# Patient Record
Sex: MALE | Race: WHITE
[De-identification: names, ages, dates, MRNs, and addresses within clinical notes are randomized per-mention and may not be internally consistent; named-entity substitution may affect disease eponyms.]

---

## 2018-06-07 ENCOUNTER — HOSPITAL ENCOUNTER (INPATIENT)
Dept: HOSPITAL 96 - M.ERS | Age: 63
LOS: 1 days | Discharge: HOME | DRG: 313 | End: 2018-06-08
Attending: INTERNAL MEDICINE | Admitting: INTERNAL MEDICINE
Payer: COMMERCIAL

## 2018-06-07 VITALS — WEIGHT: 254 LBS | BODY MASS INDEX: 37.62 KG/M2 | HEIGHT: 69.02 IN

## 2018-06-07 VITALS — DIASTOLIC BLOOD PRESSURE: 67 MMHG | SYSTOLIC BLOOD PRESSURE: 105 MMHG

## 2018-06-07 VITALS — DIASTOLIC BLOOD PRESSURE: 75 MMHG | SYSTOLIC BLOOD PRESSURE: 117 MMHG

## 2018-06-07 DIAGNOSIS — I10: ICD-10-CM

## 2018-06-07 DIAGNOSIS — R07.89: Primary | ICD-10-CM

## 2018-06-07 DIAGNOSIS — E66.9: ICD-10-CM

## 2018-06-07 DIAGNOSIS — R06.09: ICD-10-CM

## 2018-06-07 DIAGNOSIS — Z79.899: ICD-10-CM

## 2018-06-07 DIAGNOSIS — K21.9: ICD-10-CM

## 2018-06-07 DIAGNOSIS — Z82.49: ICD-10-CM

## 2018-06-07 DIAGNOSIS — I48.0: ICD-10-CM

## 2018-06-07 LAB
ABSOLUTE BASOPHILS: 0.1 THOU/UL (ref 0–0.2)
ABSOLUTE EOSINOPHILS: 0.2 THOU/UL (ref 0–0.7)
ABSOLUTE MONOCYTES: 0.7 THOU/UL (ref 0–1.2)
ALBUMIN SERPL-MCNC: 3.7 G/DL (ref 3.4–5)
ALP SERPL-CCNC: 75 U/L (ref 46–116)
ALT SERPL-CCNC: 37 U/L (ref 30–65)
ANION GAP SERPL CALC-SCNC: 10 MMOL/L (ref 7–16)
AST SERPL-CCNC: 20 U/L (ref 15–37)
BASOPHILS NFR BLD AUTO: 0.9 %
BILIRUB SERPL-MCNC: 0.3 MG/DL
BUN SERPL-MCNC: 27 MG/DL (ref 7–18)
CALCIUM SERPL-MCNC: 8.3 MG/DL (ref 8.5–10.1)
CHLORIDE SERPL-SCNC: 108 MMOL/L (ref 98–107)
CO2 SERPL-SCNC: 25 MMOL/L (ref 21–32)
CREAT SERPL-MCNC: 0.9 MG/DL (ref 0.6–1.3)
EOSINOPHIL NFR BLD: 2.7 %
GLUCOSE SERPL-MCNC: 121 MG/DL (ref 70–99)
GRANULOCYTES NFR BLD MANUAL: 50.6 %
HCT VFR BLD CALC: 42.8 % (ref 42–52)
HGB BLD-MCNC: 14.5 GM/DL (ref 14–18)
LYMPHOCYTES # BLD: 2.9 THOU/UL (ref 0.8–5.3)
LYMPHOCYTES NFR BLD AUTO: 37 %
MCH RBC QN AUTO: 31.7 PG (ref 26–34)
MCHC RBC AUTO-ENTMCNC: 33.8 G/DL (ref 28–37)
MCV RBC: 93.8 FL (ref 80–100)
MONOCYTES NFR BLD: 8.8 %
MPV: 8.1 FL. (ref 7.2–11.1)
NEUTROPHILS # BLD: 4 THOU/UL (ref 1.6–8.1)
NUCLEATED RBCS: 0 /100WBC
PLATELET COUNT*: 246 THOU/UL (ref 150–400)
POTASSIUM SERPL-SCNC: 3.9 MMOL/L (ref 3.5–5.1)
PROT SERPL-MCNC: 6.7 G/DL (ref 6.4–8.2)
RBC # BLD AUTO: 4.56 MIL/UL (ref 4.5–6)
RDW-CV: 12.9 % (ref 10.5–14.5)
SODIUM SERPL-SCNC: 143 MMOL/L (ref 136–145)
TROPONIN-I LEVEL: <0.06 NG/ML (ref ?–0.06)
WBC # BLD AUTO: 7.9 THOU/UL (ref 4–11)

## 2018-06-08 VITALS — SYSTOLIC BLOOD PRESSURE: 106 MMHG | DIASTOLIC BLOOD PRESSURE: 67 MMHG

## 2018-06-08 VITALS — SYSTOLIC BLOOD PRESSURE: 117 MMHG | DIASTOLIC BLOOD PRESSURE: 73 MMHG

## 2018-06-08 VITALS — DIASTOLIC BLOOD PRESSURE: 74 MMHG | SYSTOLIC BLOOD PRESSURE: 114 MMHG

## 2018-06-08 VITALS — DIASTOLIC BLOOD PRESSURE: 66 MMHG | SYSTOLIC BLOOD PRESSURE: 96 MMHG

## 2018-06-08 NOTE — NUR
VSS. PT ECHO COMPLETED AND RESULTS REVIEWED BY CARDIOLOGIST. CARDIOLOGY HAS
SIGNED OFF. PT OKAY TO DISCHARGE PER TELEPHONE ORDER FROM DR BILL.
DISCHARGE COMPLETED AS CHARTED. DISCHARGE SUMMARY GONE OVER WITH PT. PT
COMMUNICATES UNDERSTANDING. SCRIPTS GIVEN. CARE NOTES GIVEN. PT AWARE OF
FOLLOW UP APPOINTMENTS. IV AND CARDIAC MONITOR REMOVED. ALL BELONGINGS
GATHERED AND READY TO SEND WITH THE PT. PT FINISHING DINNER AT THIS TIME AND
THEN WILL BE READY TO GO HOME WITH SPOUSE. CALL LIGHT IS WITHIN REACH. LOW
FALL RISK PRECAUTIONS ARE IN PLACE. WCTM UNTIL PT DISCHARGES.

## 2018-06-08 NOTE — NUR
RECEIVED REPORT FROM FLEX WHITE. ASSUMED CARE OF PT AROUND 0730. PT A&O X4.
VSS. O2 SAT 98% ON RA. CARDIAC MONITOR IN PLACE TRACING SR. AM ASSESSMENT AND
VITALS COMPLETED AS CHARTED. IV TO RIGHT AC INTACT AND SALINE LOCKED. PT
DENIES PAIN OR DISCOMFORT SO FAR THIS SHIFT. PT INFORMED OF PLAN OF CARE. PT
COMMUNICATES UNDERSTANDING. PT TO HAVE ECHO, AND IF NORMAL PT MAY BE ABLE TO
DC THIS AFTERNOON PER CARDIOLOGY. PT CURRENTLY RESTING IN BED. WIFE AT
BEDSIDE. LOW FALL RISK PRECAUTIONS IN PLACE. CALL LIGHT IS WITHIN REACH.
HOURLY ROUNDING PERFORMED. WCTM FOR DURATION OF SHIFT.

## 2018-06-08 NOTE — NUR
END SHIFT: PT RESTED WELL. NO COMPLAINTS, NO PAIN. NO SOA- ON RA. AWAITING
CARDS CONS. SAFETY PRECAUTIONS IN PLACE. CALL LIGHT IN REACH. PERFORMED HOURLY
ROUNDING. WILL CONT TO MONITOR.

## 2018-06-08 NOTE — EKG
[place] [address]
Phone:  [phone]                     ELECTROCARDIOGRAM REPORT      
_______________________________________________________________________________
 
Name:       YULIET COONEY                Room:           [address] [place]  
[facility]#:  [id number]      Account #:      [id number]  
Admission:  18     Attend Phys:    ROSA Sanchez
Discharge:               Date of Birth:  [birth year]  
         Report #: [id number]
    [id number]
_______________________________________________________________________________
THIS REPORT FOR:  //name//                      
 
                         [facility] ED
                                       
Test Date:    2018               Test Time:    21:05:41
Pat Name:     YULIET COONEY            Department:   
Patient ID:   [id number]            Room:         [place] [address]
Gender:       M                        Technician:   [place]
:          1955               Requested By: Tahira Thomas
Order Number: [id number] MD:   Caleb Hernandez
                                 Measurements
Intervals                              Axis          
Rate:         92                       P:            
IN:                                    QRS:          22
QRSD:         86                       T:            0
QT:           340                                    
QTc:          421                                    
                           Interpretive Statements
Atrial fibrillation
Borderline T abnormalities, inferior leads
No previous ECG available for comparison
 
Electronically Signed On 2018 13:04:32 CDT by Caleb Hernandez
[email or web address]
 
 
 
 
 
 
 
 
 
 
 
 
 
 
 
 
 
 
 
  <ELECTRONICALLY SIGNED>
                                           By: Caleb Hernandez MD, [facility]     
  18     1304
D: 06/2105   _____________________________________
T: 18   Caleb Hernandez MD, FACC       /EPI

## 2018-06-08 NOTE — NUR
MET WITH PT TO DISCUSS HOME SITUATION/DC PLANNING. PT LIVES WITH WIFE.  WORKS
AND IS INDEPENDENT AND ACTIVE. PT USES NO EQUIPMENT OR HOME CARE.  DENIES DC
NEEDS.

## 2018-06-08 NOTE — 2DMMODE
[place] [address]
Phone:  [phone] 2 D/M-MODE ECHOCARDIOGRAM     
_______________________________________________________________________________
 
Name:         YULIET COONEY               Room:          [address] [place] 
[facility]#:    [id number]     Account #:     [id number]  
Admission:    18    Attend Phys:   Esdras Pham
Discharge:                Date of Birth: [birth year]  
Date of Service: 18 1237  Report #:      [id number]
        [id number]
_______________________________________________________________________________
THIS REPORT FOR:  //name//                      
 
 
--------------- APPROVED REPORT --------------
 
 
Study performed:  2018 10:28:50
 
EXAM: Comprehensive 2D, Doppler, and color-flow 
Echocardiogram 
Patient Location: In-Patient   
Room #:  formerly [facility]     Status:  routine
 
      BSA:         2.29
HR: 63 bpm BP:          114/74 mmHg 
Rhythm: NSR     
 
Other Information 
Study Quality: Good
 
Indications
Dyspnea 
Palpitations
 
2D Dimensions
   LVEF(%):  67.96 (&gt;50%)
IVSd:  12.34 (7-11mm) LVOT Diam:  27.11 (18-24mm) 
LVDd:  43.89 mm  
PWd:  10.93 (7-11mm) Ascending Ao:  36.91 (22-36mm)
LVDs:  27.37 (25-40mm) 
Aortic Root:  31.54 mm 
   Anaya's LVEF:  67.96 %
 
Volumes
Left Atrial Volume (Systole) 
    LA ESV Index:  34.60 mL/m2
 
Aortic Valve
AoV Peak Bam.:  1.55 m/s 
AO Peak Gr.:  9.64 mmHg  LVOT Max PG:  3.08 mmHg
AO Mean Gr.:  5.04 mmHg  LVOT Mean P.19 mmHg
    LVOT Max V:  0.88 m/s
AO V2 VTI:  29.35 cm  LVOT Mean V:  0.49 m/s
CHIP (VTI):  3.41 cm2  LVOT V1 VTI:  17.36 cm
 
Mitral Valve
 
 
[place] [address]
Phone:  [phone]                     2 D/M-MODE ECHOCARDIOGRAM     
_______________________________________________________________________________
 
Name:         YULIET COONEY               Room:          [address] [place] 
..#:    [id number]     Account #:     [id number]  
Admission:    18    Attend Phys:   Esdras Pham
Discharge:                Date of Birth: [birth year]  
Date of Service: 18 1237  Report #:      [id number]
        [id number]
_______________________________________________________________________________
    E/A Ratio:  1.50
    MV Decel. Time:  207.19 ms
MV E Max Bam.:  0.62 m/s 
MV PHT:  60.09 ms  
MVA (PHT):  3.66 cm2  
 
TDI
E/Lateral E':  4.43 E/Medial E':  6.89
   Medial E' Bam.:  0.09 m/s
   Lateral E' Bam.:  0.14 m/s
 
Pulmonary Valve
PV Peak Bam.:  1.47 m/s PV Peak Gr.:  8.68 mmHg
 
Tricuspid Valve
TR Peak Gr.:  20.56 mmHg RVSP:  25.00 mmHg
 
Left Ventricle
The left ventricle is normal size. There is normal LV segmental wall 
motion. There is normal left ventricular wall thickness. Left 
ventricular systolic function is normal. The left ventricular 
ejection fraction is within the normal range. LVEF is 55-60%. The 
left ventricular diastolic function is normal.
 
Right Ventricle
The right ventricle is normal size. The right ventricular systolic 
function is normal.
 
Atria
The left atrium size is normal. The right atrium size is 
normal.
 
Aortic Valve
Mild aortic valve sclerosis. No aortic regurgitation is present. 
There is no aortic valvular stenosis.
 
Mitral Valve
The mitral valve is normal in structure. Trace mitral regurgitation. 
No evidence of mitral valve stenosis.
 
Tricuspid Valve
The tricuspid valve is normal in structure. Trace tricuspid 
regurgitation. The RVSP is ___25____ mmHg.
 
Pulmonic Valve
The pulmonary valve is normal in structure. There is no pulmonic 
 
 
[place] [address]
Phone:  [phone]                     2 D/M-MODE ECHOCARDIOGRAM     
_______________________________________________________________________________
 
Name:         YULIET COONEY               Room:          [address] [place] 
.R.#:    [id number]     Account #:     [id number]  
Admission:    18    Attend Phys:   Esdras Pham
Discharge:                Date of Birth: [birth year]  
Date of Service: 18 1237  Report #:      [id number]
        [id number]
_______________________________________________________________________________
valvular regurgitation.
 
Great Vessels
The aortic root is normal in size. IVC is normal in size and 
collapses with &gt;50% inspiration
 
Pericardium
There is no pericardial effusion.
 
&lt;Conclusion&gt;
The left ventricle is normal size.
There is normal left ventricular wall thickness.
Left ventricular systolic function is normal.
The left ventricular ejection fraction is within the normal 
range.
LVEF is 55-60%.
The left ventricular diastolic function is normal.
The right ventricle is normal size.
The left atrium size is normal.
Mild aortic valve sclerosis.
No aortic regurgitation is present.
There is no aortic valvular stenosis.
The mitral valve is normal in structure.
Trace mitral regurgitation.
The tricuspid valve is normal in structure.
Trace tricuspid regurgitation.
The RVSP is ___25____ mmHg.
IVC is normal in size and collapses with &gt;50% inspiration
There is no pericardial effusion.
There is normal LV segmental wall motion.
 
 
 
 
 
 
 
 
 
 
 
 
 
 
  <ELECTRONICALLY SIGNED>
                                           By: Caleb Hernandez MD, FACC     
  18     1237
D: 18 1237   _____________________________________
T: 18 1237   Caleb Hernandez MD, FACC       /INF

## 2018-06-08 NOTE — CON
[address]  [address]                    CONSULTATION                  
_______________________________________________________________________________
 
Name:       YULIET COONEY                Room:           [address]    [place]  
M.R.#:  [id number]      Account #:      [id number]  
Admission:  06/07/18     Attend Phys:    ROSA Sanchez
Discharge:               Date of Birth:  [birth year]  
         Report #: [id number]
                                                                     [id number]  
_______________________________________________________________________________
THIS REPORT FOR:  //name//                      
 
CC: Shan Pham 
 
DATE OF SERVICE:  06/08/2018
 
 
CARDIOLOGY CONSULTATION
 
The patient in room 224.
 
HISTORY OF PRESENT ILLNESS:  The patient is a very pleasant 63-year-old auto
.  For the last several weeks to months, he has noted brief episodes
where he senses an irregular pulse.  He presented yesterday, was noted to have
atrial fibrillation with a moderate ventricular response that was noted to be
greater than 100 by his nurse wife at home.  He noted mild lightheadedness. 
There have been no symptoms to suggest systemic embolic phenomenon.  He has
underlying hypertension, but denies diabetes, congestive heart failure, history
of TIA, history of cerebrovascular accident.
 
He has had some vague chest discomfort associated with protracted coughing,
which persists.  It is not related to exertion or motion or extremes of
temperature.
 
He does note mild lightheadedness occasionally associated with the palpitations.
 
His risk factors for coronary artery disease include the aforementioned
hypertension.  He has a family history of atrial fibrillation and one
first-degree relative required placement of a pacemaker for tachybrady syndrome.
 
PAST MEDICAL HISTORY:  Related to herniorrhaphy, sinus surgery and vein
insufficiency.
 
FAMILY HISTORY:  There is no family history of sudden death or premature
coronary event.
 
SOCIAL HISTORY:  The patient is .  He is an .  He neither
smokes nor drinks.
 
REVIEW OF SYSTEMS:  Remarkable for the following positives.
RESPIRATORY:  He notes a cough related to a recent upper respiratory infection.
CARDIOVASCULAR:  He notes the recent palpitations, which are periodic with
associated mild lightheadedness.
MUSCULOSKELETAL:  He notes mild arthritic complaints.
 
 
 
[place] [address]                    CONSULTATION                  
_______________________________________________________________________________
 
Name:       YULIET COONEY                Room:           [address] [place]  
[facility]#:  [id number]      Account #:      [id number]  
Admission:  06/07/18     Attend Phys:    ROSA Sanchez
Discharge:               Date of Birth:  [birth year]  
         Report #: [id number]
                                                                     [id number]  
_______________________________________________________________________________
EYES:  He wears glasses.  Remainder of 13-point review of systems is
unremarkable.
 
PHYSICAL EXAMINATION:
GENERAL:  Reveals a modestly overweight, middle-aged male, in no acute distress.
VITAL SIGNS:  Blood pressure 110/70, pulse rate is 82, respirations are 18 per
minute.
NECK:  Jugular venous pressure is normal.  There are no carotid bruits.  There
is no thyromegaly, no scleral icterus.
CHEST:  Clear.
CARDIAC:  Reveals a regular rhythm with a question of S4 gallop, without rubs or
murmurs.
ABDOMEN:  Mildly obese.
EXTREMITIES:  Without edema with intact femoral, pedal and radial pulses.  There
are no deformity or arthritic changes.
 
IMPRESSION:
1.  Paroxysmal atrial fibrillation.
2.  Hypertension.
3.  Mild weight excess.
4.  Recent upper respiratory infection.
 
RECOMMENDATIONS:
1.  Echocardiogram.
2.  Given the relatively increased frequency of episodes of paroxysmal atrial
fibrillation, I would recommend starting on A1c agent assuming no significant
structural heart disease and echocardiogram to be performed today.
 
He has a _____ score and we debated the use of a more potent anticoagulant or
aspirin alone.  That is to be decided.
 
I will continue lisinopril.
 
Thus, echocardiogram is to be reviewed, and subsequent antiarrhythmic therapy
will be initiated after review of that data.
 
Thank you for allowing me to see the patient in cardiovascular assessment.
 
 
 
 
 
 
 
<ELECTRONICALLY SIGNED>
                                        By:  Caleb Hernandez MD, FACC     
06/08/18     1311
D: 06/08/18 0955_______________________________________
T: 06/08/18 1233Caleb Hernandez MD, FACC        /nt

## 2018-06-08 NOTE — EKG
[place] [address]
Phone:  [phone]                     ELECTROCARDIOGRAM REPORT      
_______________________________________________________________________________
 
Name:       YULIET COONEY                Room:           [address] [place]  
[facility]#:  [id number]      Account #:      [id number]  
Admission:  18     Attend Phys:    ROSA Sanchez
Discharge:               Date of Birth:  [birth year]  
         Report #: [id number]
    [id number]
_______________________________________________________________________________
THIS REPORT FOR:  //name//                      
 
                         [facility] ED
                                       
Test Date:    2018               Test Time:    21:55:57
Pat Name:     YULIET COONEY            Department:   
Patient ID:   [id number]            Room:          
Gender:                               Technician:   [facility]
:          1955               Requested By: Tahira Thomas
Order Number: [id number] MD:   Caleb Hernandez
                                 Measurements
Intervals                              Axis          
Rate:         71                       P:            29
PA:           164                      QRS:          24
QRSD:         87                       T:            10
QT:           369                                    
QTc:          401                                    
                           Interpretive Statements
Sinus rhythm
No previous ECG available for comparison
 
Electronically Signed On 2018 13:05:07 CDT by Caleb Hernandez
[email or web address]
 
 
 
 
 
 
 
 
 
 
 
 
 
 
 
 
 
 
 
 
  <ELECTRONICALLY SIGNED>
                                           By: Caleb Hernandez MD, [facility]     
  18     1305
D: 18 2155   _____________________________________
T: 18 2155   Caleb Hernandez MD, FACC       /EPI